# Patient Record
Sex: MALE | Race: WHITE | NOT HISPANIC OR LATINO
[De-identification: names, ages, dates, MRNs, and addresses within clinical notes are randomized per-mention and may not be internally consistent; named-entity substitution may affect disease eponyms.]

---

## 2020-10-02 DIAGNOSIS — K44.9 DIAPHRAGMATIC HERNIA W/OUT OBSTRUCTION OR GANGRENE: ICD-10-CM

## 2020-10-05 ENCOUNTER — APPOINTMENT (OUTPATIENT)
Dept: SURGERY | Facility: CLINIC | Age: 65
End: 2020-10-05
Payer: COMMERCIAL

## 2020-10-05 VITALS
RESPIRATION RATE: 16 BRPM | HEART RATE: 72 BPM | SYSTOLIC BLOOD PRESSURE: 119 MMHG | BODY MASS INDEX: 28.12 KG/M2 | HEIGHT: 66 IN | DIASTOLIC BLOOD PRESSURE: 77 MMHG | TEMPERATURE: 98.1 F | OXYGEN SATURATION: 98 % | WEIGHT: 175 LBS

## 2020-10-05 DIAGNOSIS — R07.89 OTHER CHEST PAIN: ICD-10-CM

## 2020-10-05 DIAGNOSIS — R13.10 DYSPHAGIA, UNSPECIFIED: ICD-10-CM

## 2020-10-05 DIAGNOSIS — Z87.09 PERSONAL HISTORY OF OTHER DISEASES OF THE RESPIRATORY SYSTEM: ICD-10-CM

## 2020-10-05 DIAGNOSIS — F17.290 NICOTINE DEPENDENCE, OTHER TOBACCO PRODUCT, UNCOMPLICATED: ICD-10-CM

## 2020-10-05 DIAGNOSIS — Z87.442 PERSONAL HISTORY OF URINARY CALCULI: ICD-10-CM

## 2020-10-05 PROCEDURE — 99203 OFFICE O/P NEW LOW 30 MIN: CPT

## 2020-10-05 RX ORDER — EVOLOCUMAB 140 MG/ML
140 INJECTION, SOLUTION SUBCUTANEOUS
Qty: 2 | Refills: 0 | Status: ACTIVE | COMMUNITY
Start: 2020-02-07

## 2020-10-05 RX ORDER — TESTOSTERONE
POWDER (GRAM) MISCELLANEOUS
Refills: 0 | Status: ACTIVE | COMMUNITY

## 2020-10-05 RX ORDER — TURMERIC 95 %
POWDER (GRAM) MISCELLANEOUS
Refills: 0 | Status: ACTIVE | COMMUNITY

## 2020-10-05 RX ORDER — DOXYCYCLINE HYCLATE 100 MG/1
100 CAPSULE ORAL
Qty: 14 | Refills: 0 | Status: DISCONTINUED | COMMUNITY
Start: 2020-06-15

## 2020-10-05 RX ORDER — BLOOD SUGAR DIAGNOSTIC
STRIP MISCELLANEOUS
Qty: 100 | Refills: 0 | Status: ACTIVE | COMMUNITY
Start: 2020-07-15

## 2020-10-05 RX ORDER — MONTELUKAST 10 MG/1
10 TABLET, FILM COATED ORAL
Qty: 30 | Refills: 0 | Status: ACTIVE | COMMUNITY
Start: 2020-08-24

## 2020-10-05 RX ORDER — PSYLLIUM HUSK 0.4 G
CAPSULE ORAL
Refills: 0 | Status: ACTIVE | COMMUNITY

## 2020-10-05 RX ORDER — AZELASTINE HYDROCHLORIDE 205.5 UG/1
0.15 SPRAY, METERED NASAL
Qty: 30 | Refills: 0 | Status: ACTIVE | COMMUNITY
Start: 2020-08-24

## 2020-10-05 NOTE — ASSESSMENT
[FreeTextEntry1] : 65-year-old man with\par 1.  Costochondral pain\par 2.  Intermittent painful swallowing

## 2020-10-05 NOTE — HISTORY OF PRESENT ILLNESS
[de-identified] : The patient is a 65-year-old gentleman referred here by his primary care doctor for evaluation of a "hiatal hernia."\par \par The patient has of late been experiencing pain and discomfort in his subxiphoid region that occurs with exercise, like yoga, and golf.  He denies feeling any swelling or protrusion.  While he has a prior history of heartburn and reflux, he states this has been resolved for quite some time since cutting out certain foods and not eating late at night.  He initially reports no dysphagia, however then mentions that he does experience painful swallowing and occasional chest pain after drinking liquids too quickly.  He does not report any regurgitation.  He does not report any dysphagia with solid foods.  He does not describe any unintended weight loss.  He currently has no reflux or heartburn.\par \par Past medical history is significant for prediabetes, currently on metformin\par Past surgical history significant for hip replacement, trigger finger release, and surgery for deviated septum

## 2020-10-05 NOTE — PHYSICAL EXAM
[No Rash or Lesion] : No rash or lesion [Alert] : alert [Oriented to Person] : oriented to person [Oriented to Place] : oriented to place [Oriented to Time] : oriented to time [Calm] : calm [de-identified] : Well-appearing, no acute distress [de-identified] : Sclerae anicteric [de-identified] : Normal respirations [de-identified] : Soft, nontender, nondistended.  Prominent xiphoid.

## 2020-10-05 NOTE — PLAN
[FreeTextEntry1] : -Upper GI series ordered to rule out esophageal dysmotility, hiatal hernia\par -Advised rest, NSAIDs for costochondral pain

## 2020-10-05 NOTE — CONSULT LETTER
[Dear  ___] : Dear  [unfilled], [Consult Letter:] : I had the pleasure of evaluating your patient, [unfilled]. [Please see my note below.] : Please see my note below. [Consult Closing:] : Thank you very much for allowing me to participate in the care of this patient.  If you have any questions, please do not hesitate to contact me. [Sincerely,] : Sincerely, [FreeTextEntry2] : Dr. Jevon Miller [FreeTextEntry3] : Antoni Gutiérrez MD, FACS, FASMBS\par [facility]\par [address]\[place] [address]\par tel. [phone]\par fax [phone]\par  \par

## 2020-10-09 ENCOUNTER — APPOINTMENT (OUTPATIENT)
Dept: RADIOLOGY | Facility: HOSPITAL | Age: 65
End: 2020-10-09
Payer: COMMERCIAL

## 2020-10-09 ENCOUNTER — RESULT REVIEW (OUTPATIENT)
Age: 65
End: 2020-10-09

## 2020-10-09 ENCOUNTER — OUTPATIENT (OUTPATIENT)
Dept: OUTPATIENT SERVICES | Facility: HOSPITAL | Age: 65
LOS: 1 days | End: 2020-10-09
Payer: COMMERCIAL

## 2020-10-09 DIAGNOSIS — Z00.00 ENCOUNTER FOR GENERAL ADULT MEDICAL EXAMINATION WITHOUT ABNORMAL FINDINGS: ICD-10-CM

## 2020-10-09 DIAGNOSIS — R13.10 DYSPHAGIA, UNSPECIFIED: ICD-10-CM

## 2020-10-09 PROCEDURE — 74246 X-RAY XM UPR GI TRC 2CNTRST: CPT | Mod: 26

## 2020-10-09 PROCEDURE — 74246 X-RAY XM UPR GI TRC 2CNTRST: CPT

## 2021-02-26 ENCOUNTER — TRANSCRIPTION ENCOUNTER (OUTPATIENT)
Age: 66
End: 2021-02-26

## 2021-08-25 ENCOUNTER — TRANSCRIPTION ENCOUNTER (OUTPATIENT)
Age: 66
End: 2021-08-25

## 2022-06-29 ENCOUNTER — NON-APPOINTMENT (OUTPATIENT)
Age: 67
End: 2022-06-29

## 2022-06-29 ENCOUNTER — APPOINTMENT (OUTPATIENT)
Dept: ORTHOPEDIC SURGERY | Facility: CLINIC | Age: 67
End: 2022-06-29

## 2022-06-29 PROCEDURE — 99203 OFFICE O/P NEW LOW 30 MIN: CPT

## 2022-06-29 PROCEDURE — 73560 X-RAY EXAM OF KNEE 1 OR 2: CPT | Mod: RT

## 2022-06-30 NOTE — CONSULT LETTER
[Dear  ___] : Dear  [unfilled], [Consult Letter:] : I had the pleasure of evaluating your patient, [unfilled]. [Please see my note below.] : Please see my note below. [Consult Closing:] : Thank you very much for allowing me to participate in the care of this patient.  If you have any questions, please do not hesitate to contact me. [Sincerely,] : Sincerely, [FreeTextEntry3] : Marlo Ochoa III, MD \par MARY/madeline

## 2022-06-30 NOTE — DISCUSSION/SUMMARY
[de-identified] : The patient presents with a meniscal tear, right knee.  At this time I recommend he undergo an MRI.  He will be reassessed after the MRI.

## 2022-06-30 NOTE — REVIEW OF SYSTEMS
[Joint Pain] : joint pain [Joint Stiffness] : joint stiffness [Nasal Discharge] : nasal discharge [Sore Throat] : sore throat [SOB on Exertion] : shortness of breath on exertion [Negative] : Heme/Lymph [FreeTextEntry6] : Shortness of breath

## 2022-06-30 NOTE — PHYSICAL EXAM
[de-identified] : Left knee:\par Knee: Range of Motion in Degrees	\par 	                  Claimant:	Normal:	\par Flexion Active	  135 	                135-degrees	\par Flexion Passive	  135	                135-degrees	\par Extension Active	  0-5	                0-5-degrees	\par Extension Passive	  0-5	                0-5-degrees	\par \par No weakness to flexion/extension.  No evidence of instability in the AP plane or varus or valgus stress.  Negative  Lachman.  Negative pivot shift.  Negative anterior drawer test.  Negative posterior drawer test.  Negative Hayden.  Negative Apley grind.  No medial or lateral joint line tenderness.  No tenderness over the medial and lateral facet of the patella.  No patellofemoral crepitations.  No lateral tilting patella.  No patellar apprehension.  No crepitation in the medial and lateral femoral condyle.  No proximal or distal swelling, edema or tenderness.  No gross motor or sensory deficits.  No intra-articular swelling.  2+ DP and PT pulses. No varus or valgus malalignment.  Skin is intact.  No rashes, scars or lesions.  \par  \par Right knee:\par Knee: Range of Motion in Degrees	\par 	                  Claimant:	Normal:	\par Flexion Active	  135 	                135-degrees	\par Flexion Passive	  135	                135-degrees	\par Extension Active	  0-5	                0-5-degrees	\par Extension Passive	  0-5	                0-5-degrees	\par \par Mild effusion.  No weakness to flexion/extension.  No evidence of instability in the AP plane or varus or valgus stress.  Negative  Lachman.  Negative pivot shift.  Negative anterior drawer test.  Negative posterior drawer test.  Positive medial joint line tenderness.  Negative lateral joint line tenderness.  Positive Hayden.  Positive Apley grind.  No tenderness over the medial and lateral facet of the patella.  No patellofemoral crepitations.  No lateral tilting patella.  No patella apprehension.  No crepitation in the medial and lateral femoral condyle.  No proximal or distal swelling, edema or tenderness.  No gross motor or sensory deficits.  2+ DP and PT pulses.  No varus or valgus malalignment.  Skin is intact.  No rashes, scars or lesions.   \par   [de-identified] : Gait: Slightly antalgic to antalgic gait.  Station: Normal  [de-identified] : Appearance: Well-developed, well-nourished male  in no acute distress.  [de-identified] : Radiographs taken in the office today, one to two views of the right knee, show no obvious osseous abnormality.

## 2022-06-30 NOTE — ADDENDUM
[FreeTextEntry1] : This note was written by Madison Sena on 06/30/2022 acting as scribe for Marlo Ochoa III, MD

## 2022-06-30 NOTE — HISTORY OF PRESENT ILLNESS
[de-identified] : Nikolas comes in today with complaints of pain to his right knee going on for the last several months.  He had several episodes when he was playing golf of twisting and having the onset of pain and swelling.  He had to have fluid aspirated at one point.  He notes episodes of the knee catching and popping.  The patient states the onset/injury occurred on 4/1/22.  This injury is not work related or due to an automobile accident.  The patient states the pain is intermittent and localized.  The patient describes the pain as sharp. The patient states rest and medication make the symptoms better while bending makes the symptoms worse.  [4] : a current pain level of 4/10 [de-identified] : Physical therapy [de-identified] : NSAIDS

## 2022-07-05 DIAGNOSIS — Z83.511 FAMILY HISTORY OF GLAUCOMA: ICD-10-CM

## 2022-07-05 DIAGNOSIS — Z82.49 FAMILY HISTORY OF ISCHEMIC HEART DISEASE AND OTHER DISEASES OF THE CIRCULATORY SYSTEM: ICD-10-CM

## 2022-07-05 DIAGNOSIS — Z84.1 FAMILY HISTORY OF DISORDERS OF KIDNEY AND URETER: ICD-10-CM

## 2022-07-05 DIAGNOSIS — Z86.39 PERSONAL HISTORY OF OTHER ENDOCRINE, NUTRITIONAL AND METABOLIC DISEASE: ICD-10-CM

## 2022-07-05 DIAGNOSIS — Z87.19 PERSONAL HISTORY OF OTHER DISEASES OF THE DIGESTIVE SYSTEM: ICD-10-CM

## 2022-07-05 DIAGNOSIS — Z87.442 PERSONAL HISTORY OF URINARY CALCULI: ICD-10-CM

## 2022-07-13 ENCOUNTER — APPOINTMENT (OUTPATIENT)
Dept: ORTHOPEDIC SURGERY | Facility: CLINIC | Age: 67
End: 2022-07-13

## 2022-07-13 DIAGNOSIS — S83.206A UNSPECIFIED TEAR OF UNSPECIFIED MENISCUS, CURRENT INJURY, RIGHT KNEE, INITIAL ENCOUNTER: ICD-10-CM

## 2022-07-13 PROCEDURE — 99441: CPT

## 2022-07-20 ENCOUNTER — APPOINTMENT (OUTPATIENT)
Dept: ORTHOPEDIC SURGERY | Facility: CLINIC | Age: 67
End: 2022-07-20

## 2022-07-27 PROBLEM — S83.206A ACUTE MENISCAL TEAR OF RIGHT KNEE, INITIAL ENCOUNTER: Status: ACTIVE | Noted: 2022-06-29

## 2022-08-01 ENCOUNTER — OUTPATIENT (OUTPATIENT)
Dept: OUTPATIENT SERVICES | Facility: HOSPITAL | Age: 67
LOS: 1 days | End: 2022-08-01
Payer: COMMERCIAL

## 2022-08-01 VITALS
TEMPERATURE: 98 F | HEART RATE: 65 BPM | WEIGHT: 182.98 LBS | HEIGHT: 66 IN | SYSTOLIC BLOOD PRESSURE: 109 MMHG | DIASTOLIC BLOOD PRESSURE: 69 MMHG | OXYGEN SATURATION: 100 % | RESPIRATION RATE: 16 BRPM

## 2022-08-01 DIAGNOSIS — E89.2 POSTPROCEDURAL HYPOPARATHYROIDISM: Chronic | ICD-10-CM

## 2022-08-01 DIAGNOSIS — Z96.641 PRESENCE OF RIGHT ARTIFICIAL HIP JOINT: Chronic | ICD-10-CM

## 2022-08-01 DIAGNOSIS — Z01.818 ENCOUNTER FOR OTHER PREPROCEDURAL EXAMINATION: ICD-10-CM

## 2022-08-01 DIAGNOSIS — S83.241A OTHER TEAR OF MEDIAL MENISCUS, CURRENT INJURY, RIGHT KNEE, INITIAL ENCOUNTER: ICD-10-CM

## 2022-08-01 DIAGNOSIS — Z98.890 OTHER SPECIFIED POSTPROCEDURAL STATES: Chronic | ICD-10-CM

## 2022-08-01 LAB
A1C WITH ESTIMATED AVERAGE GLUCOSE RESULT: 6.4 % — HIGH (ref 4–5.6)
ANION GAP SERPL CALC-SCNC: 5 MMOL/L — SIGNIFICANT CHANGE UP (ref 5–17)
APTT BLD: 32.4 SEC — SIGNIFICANT CHANGE UP (ref 27.5–35.5)
BASOPHILS # BLD AUTO: 0.05 K/UL — SIGNIFICANT CHANGE UP (ref 0–0.2)
BASOPHILS NFR BLD AUTO: 0.7 % — SIGNIFICANT CHANGE UP (ref 0–2)
BUN SERPL-MCNC: 14 MG/DL — SIGNIFICANT CHANGE UP (ref 7–23)
CALCIUM SERPL-MCNC: 9 MG/DL — SIGNIFICANT CHANGE UP (ref 8.5–10.1)
CHLORIDE SERPL-SCNC: 106 MMOL/L — SIGNIFICANT CHANGE UP (ref 96–108)
CO2 SERPL-SCNC: 26 MMOL/L — SIGNIFICANT CHANGE UP (ref 22–31)
CREAT SERPL-MCNC: 1.06 MG/DL — SIGNIFICANT CHANGE UP (ref 0.5–1.3)
EGFR: 77 ML/MIN/1.73M2 — SIGNIFICANT CHANGE UP
EOSINOPHIL # BLD AUTO: 0.23 K/UL — SIGNIFICANT CHANGE UP (ref 0–0.5)
EOSINOPHIL NFR BLD AUTO: 3.4 % — SIGNIFICANT CHANGE UP (ref 0–6)
ESTIMATED AVERAGE GLUCOSE: 137 MG/DL — HIGH (ref 68–114)
GLUCOSE SERPL-MCNC: 140 MG/DL — HIGH (ref 70–99)
HCT VFR BLD CALC: 54.5 % — HIGH (ref 39–50)
HGB BLD-MCNC: 18.2 G/DL — HIGH (ref 13–17)
IMM GRANULOCYTES NFR BLD AUTO: 0.4 % — SIGNIFICANT CHANGE UP (ref 0–1.5)
INR BLD: 1.02 RATIO — SIGNIFICANT CHANGE UP (ref 0.88–1.16)
LYMPHOCYTES # BLD AUTO: 2.19 K/UL — SIGNIFICANT CHANGE UP (ref 1–3.3)
LYMPHOCYTES # BLD AUTO: 31.9 % — SIGNIFICANT CHANGE UP (ref 13–44)
MCHC RBC-ENTMCNC: 29.6 PG — SIGNIFICANT CHANGE UP (ref 27–34)
MCHC RBC-ENTMCNC: 33.4 GM/DL — SIGNIFICANT CHANGE UP (ref 32–36)
MCV RBC AUTO: 88.6 FL — SIGNIFICANT CHANGE UP (ref 80–100)
MONOCYTES # BLD AUTO: 0.59 K/UL — SIGNIFICANT CHANGE UP (ref 0–0.9)
MONOCYTES NFR BLD AUTO: 8.6 % — SIGNIFICANT CHANGE UP (ref 2–14)
NEUTROPHILS # BLD AUTO: 3.77 K/UL — SIGNIFICANT CHANGE UP (ref 1.8–7.4)
NEUTROPHILS NFR BLD AUTO: 55 % — SIGNIFICANT CHANGE UP (ref 43–77)
PLATELET # BLD AUTO: 249 K/UL — SIGNIFICANT CHANGE UP (ref 150–400)
POTASSIUM SERPL-MCNC: 4.3 MMOL/L — SIGNIFICANT CHANGE UP (ref 3.5–5.3)
POTASSIUM SERPL-SCNC: 4.3 MMOL/L — SIGNIFICANT CHANGE UP (ref 3.5–5.3)
PROTHROM AB SERPL-ACNC: 11.8 SEC — SIGNIFICANT CHANGE UP (ref 10.5–13.4)
RBC # BLD: 6.15 M/UL — HIGH (ref 4.2–5.8)
RBC # FLD: 13.6 % — SIGNIFICANT CHANGE UP (ref 10.3–14.5)
SODIUM SERPL-SCNC: 137 MMOL/L — SIGNIFICANT CHANGE UP (ref 135–145)
WBC # BLD: 6.86 K/UL — SIGNIFICANT CHANGE UP (ref 3.8–10.5)
WBC # FLD AUTO: 6.86 K/UL — SIGNIFICANT CHANGE UP (ref 3.8–10.5)

## 2022-08-01 PROCEDURE — 93005 ELECTROCARDIOGRAM TRACING: CPT

## 2022-08-01 PROCEDURE — 36415 COLL VENOUS BLD VENIPUNCTURE: CPT

## 2022-08-01 PROCEDURE — 99214 OFFICE O/P EST MOD 30 MIN: CPT | Mod: 25

## 2022-08-01 PROCEDURE — 85610 PROTHROMBIN TIME: CPT

## 2022-08-01 PROCEDURE — 83036 HEMOGLOBIN GLYCOSYLATED A1C: CPT

## 2022-08-01 PROCEDURE — 80048 BASIC METABOLIC PNL TOTAL CA: CPT

## 2022-08-01 PROCEDURE — 85730 THROMBOPLASTIN TIME PARTIAL: CPT

## 2022-08-01 PROCEDURE — 93010 ELECTROCARDIOGRAM REPORT: CPT

## 2022-08-01 PROCEDURE — 85025 COMPLETE CBC W/AUTO DIFF WBC: CPT

## 2022-08-01 NOTE — H&P PST ADULT - ACTIVITY
Patient takes Jaunmet at home  Continue with metformin 500mg BID  HSS and accuchecks  A1c = 7
Plays golf 2x a week, Yoga intermittently

## 2022-08-01 NOTE — H&P PST ADULT - ASSESSMENT
67 y.o male scheduled for  67 y.o male scheduled for  Right Knee Arthroscopy Meniscectomy Debridement   Plan  1. Stop all NSAIDS, herbal supplements and vitamins for 7 days.  2. NPO at midnight.  3. Take the following medications--none--with small sips of water on the morning of your procedure/surgery.  4. Use EZ sponges as directed  5. Labs, EKG as per surgeon  6. PMD ISABELA Miller  visit for optimization prior to surgery as per surgeon  7. COVID swab to be done 8/9/2022

## 2022-08-01 NOTE — H&P PST ADULT - NSICDXPASTSURGICALHX_GEN_ALL_CORE_FT
PAST SURGICAL HISTORY:  H/O nasal septoplasty 1970's    H/O parathyroidectomy one--10 yrs ago    History of esophagogastroduodenoscopy (EGD) 2020--Esophageal dilatation, last EGD --6/2022    History of right hip replacement 6/2017    Left trigger finger 2009 trigger finger release  x2    Surgery for Varicose vein pt does not remember which leg , ? right 2010

## 2022-08-01 NOTE — H&P PST ADULT - HISTORY OF PRESENT ILLNESS
67 y.o  67 y.o WD, WN male presents to PST with hx of right knee pain. Patient reports right knee pain that started the end of March. He had the pain last year but it resolved . The right knee pain recurred this year , worse after sitting. He denies swelling or instability in the knee. Patient followed with ortho and states diagnostics revealed a torn meniscus. He is active, plays golf weekly and opting for surgical intervention. Scheduled for a Right Knee Arthroscopy Meniscectomy Debridement

## 2022-08-01 NOTE — H&P PST ADULT - NSICDXPASTMEDICALHX_GEN_ALL_CORE_FT
PAST MEDICAL HISTORY:  Arthritis     DM (diabetes mellitus), type 2     GERD (gastroesophageal reflux disease)     H/O sinusitis     History of dysphagia     Hyperlipidemia     Kidney stone     Low testosterone     ALEXANDER (obstructive sleep apnea) Pt reports he had "borderline sleep study " in 2007.  No CPAP Rx at that time.  Per pt, symptoms improved after 10 lb wt loss 2010    Parathyroid dysfunction elevated calcium    Torn meniscus right knee

## 2022-08-02 DIAGNOSIS — Z01.818 ENCOUNTER FOR OTHER PREPROCEDURAL EXAMINATION: ICD-10-CM

## 2022-08-02 DIAGNOSIS — S83.241A OTHER TEAR OF MEDIAL MENISCUS, CURRENT INJURY, RIGHT KNEE, INITIAL ENCOUNTER: ICD-10-CM

## 2022-08-08 RX ORDER — OXYCODONE AND ACETAMINOPHEN 5; 325 MG/1; MG/1
5-325 TABLET ORAL
Qty: 30 | Refills: 0 | Status: ACTIVE | COMMUNITY
Start: 2022-08-08 | End: 1900-01-01

## 2022-08-12 ENCOUNTER — APPOINTMENT (OUTPATIENT)
Dept: ORTHOPEDIC SURGERY | Facility: HOSPITAL | Age: 67
End: 2022-08-12

## 2022-08-12 ENCOUNTER — OUTPATIENT (OUTPATIENT)
Dept: INPATIENT UNIT | Facility: HOSPITAL | Age: 67
LOS: 1 days | Discharge: HOME | End: 2022-08-12
Payer: COMMERCIAL

## 2022-08-12 ENCOUNTER — TRANSCRIPTION ENCOUNTER (OUTPATIENT)
Age: 67
End: 2022-08-12

## 2022-08-12 ENCOUNTER — RESULT REVIEW (OUTPATIENT)
Age: 67
End: 2022-08-12

## 2022-08-12 VITALS
HEART RATE: 56 BPM | OXYGEN SATURATION: 99 % | SYSTOLIC BLOOD PRESSURE: 134 MMHG | TEMPERATURE: 97 F | RESPIRATION RATE: 16 BRPM | DIASTOLIC BLOOD PRESSURE: 58 MMHG

## 2022-08-12 VITALS
TEMPERATURE: 98 F | OXYGEN SATURATION: 96 % | WEIGHT: 177.91 LBS | HEART RATE: 65 BPM | SYSTOLIC BLOOD PRESSURE: 125 MMHG | DIASTOLIC BLOOD PRESSURE: 78 MMHG | HEIGHT: 67 IN | RESPIRATION RATE: 16 BRPM

## 2022-08-12 DIAGNOSIS — Z96.641 PRESENCE OF RIGHT ARTIFICIAL HIP JOINT: Chronic | ICD-10-CM

## 2022-08-12 DIAGNOSIS — E89.2 POSTPROCEDURAL HYPOPARATHYROIDISM: Chronic | ICD-10-CM

## 2022-08-12 DIAGNOSIS — M17.11 UNILATERAL PRIMARY OSTEOARTHRITIS, RIGHT KNEE: ICD-10-CM

## 2022-08-12 DIAGNOSIS — Z98.890 OTHER SPECIFIED POSTPROCEDURAL STATES: Chronic | ICD-10-CM

## 2022-08-12 DIAGNOSIS — S83.241A OTHER TEAR OF MEDIAL MENISCUS, CURRENT INJURY, RIGHT KNEE, INITIAL ENCOUNTER: ICD-10-CM

## 2022-08-12 PROCEDURE — 88304 TISSUE EXAM BY PATHOLOGIST: CPT | Mod: 26

## 2022-08-12 PROCEDURE — 82962 GLUCOSE BLOOD TEST: CPT

## 2022-08-12 PROCEDURE — 29881 ARTHRS KNE SRG MNISECTMY M/L: CPT | Mod: GC,RT

## 2022-08-12 PROCEDURE — 88304 TISSUE EXAM BY PATHOLOGIST: CPT

## 2022-08-12 RX ORDER — KETOROLAC TROMETHAMINE 30 MG/ML
30 SYRINGE (ML) INJECTION ONCE
Refills: 0 | Status: DISCONTINUED | OUTPATIENT
Start: 2022-08-12 | End: 2022-08-12

## 2022-08-12 RX ORDER — ACETAMINOPHEN 500 MG
1000 TABLET ORAL ONCE
Refills: 0 | Status: COMPLETED | OUTPATIENT
Start: 2022-08-12 | End: 2022-08-12

## 2022-08-12 RX ORDER — SODIUM CHLORIDE 9 MG/ML
1000 INJECTION, SOLUTION INTRAVENOUS
Refills: 0 | Status: DISCONTINUED | OUTPATIENT
Start: 2022-08-12 | End: 2022-08-12

## 2022-08-12 RX ORDER — PANTOPRAZOLE SODIUM 20 MG/1
1 TABLET, DELAYED RELEASE ORAL
Qty: 0 | Refills: 0 | DISCHARGE

## 2022-08-12 RX ORDER — MONTELUKAST 4 MG/1
1 TABLET, CHEWABLE ORAL
Qty: 0 | Refills: 0 | DISCHARGE

## 2022-08-12 RX ORDER — FEXOFENADINE HCL 30 MG
0 TABLET ORAL
Qty: 0 | Refills: 0 | DISCHARGE

## 2022-08-12 RX ORDER — FENTANYL CITRATE 50 UG/ML
50 INJECTION INTRAVENOUS
Refills: 0 | Status: DISCONTINUED | OUTPATIENT
Start: 2022-08-12 | End: 2022-08-12

## 2022-08-12 RX ORDER — SEMAGLUTIDE 0.68 MG/ML
0 INJECTION, SOLUTION SUBCUTANEOUS
Qty: 0 | Refills: 0 | DISCHARGE

## 2022-08-12 RX ORDER — OXYCODONE HYDROCHLORIDE 5 MG/1
5 TABLET ORAL ONCE
Refills: 0 | Status: DISCONTINUED | OUTPATIENT
Start: 2022-08-12 | End: 2022-08-12

## 2022-08-12 RX ORDER — AZELASTINE 137 UG/1
2 SPRAY, METERED NASAL
Qty: 0 | Refills: 0 | DISCHARGE

## 2022-08-12 RX ORDER — FLUTICASONE PROPIONATE 50 MCG
2 SPRAY, SUSPENSION NASAL
Qty: 0 | Refills: 0 | DISCHARGE

## 2022-08-12 RX ADMIN — OXYCODONE HYDROCHLORIDE 5 MILLIGRAM(S): 5 TABLET ORAL at 09:00

## 2022-08-12 RX ADMIN — Medication 30 MILLIGRAM(S): at 10:17

## 2022-08-12 RX ADMIN — Medication 1000 MILLIGRAM(S): at 09:30

## 2022-08-12 RX ADMIN — OXYCODONE HYDROCHLORIDE 5 MILLIGRAM(S): 5 TABLET ORAL at 09:30

## 2022-08-12 RX ADMIN — Medication 400 MILLIGRAM(S): at 09:01

## 2022-08-12 NOTE — ASU DISCHARGE PLAN (ADULT/PEDIATRIC) - CARE PROVIDER_API CALL
Marlo Ochoa)  Orthopaedic Surgery  [address] [place] [address]  Phone: [phone]  Fax: [phone]  Follow Up Time:

## 2022-08-12 NOTE — ASU DISCHARGE PLAN (ADULT/PEDIATRIC) - BRAND OF COVID-19 VACCINATION
Moderna dose 1 and 2 Normal vision: sees adequately in most situations; can see medication labels, newsprint

## 2022-08-12 NOTE — ASU PATIENT PROFILE, ADULT - FALL HARM RISK - UNIVERSAL INTERVENTIONS
Bed in lowest position, wheels locked, appropriate side rails in place/Call bell, personal items and telephone in reach/Instruct patient to call for assistance before getting out of bed or chair/Non-slip footwear when patient is out of bed/Ross to call system/Physically safe environment - no spills, clutter or unnecessary equipment/Purposeful Proactive Rounding/Room/bathroom lighting operational, light cord in reach

## 2022-08-12 NOTE — ASU DISCHARGE PLAN (ADULT/PEDIATRIC) - NS MD DC FALL RISK RISK
For information on Fall & Injury Prevention, visit: [email or web address] OR  [email or web address] OR  https://www.cdc.gov/steadi/patient.html

## 2022-08-16 PROBLEM — S83.209A UNSPECIFIED TEAR OF UNSPECIFIED MENISCUS, CURRENT INJURY, UNSPECIFIED KNEE, INITIAL ENCOUNTER: Chronic | Status: ACTIVE | Noted: 2022-08-01

## 2022-08-16 PROBLEM — E21.4 OTHER SPECIFIED DISORDERS OF PARATHYROID GLAND: Chronic | Status: ACTIVE | Noted: 2022-08-01

## 2022-08-16 PROBLEM — K21.9 GASTRO-ESOPHAGEAL REFLUX DISEASE WITHOUT ESOPHAGITIS: Chronic | Status: ACTIVE | Noted: 2022-08-01

## 2022-08-16 PROBLEM — M19.90 UNSPECIFIED OSTEOARTHRITIS, UNSPECIFIED SITE: Chronic | Status: ACTIVE | Noted: 2022-08-01

## 2022-08-16 PROBLEM — Z87.09 PERSONAL HISTORY OF OTHER DISEASES OF THE RESPIRATORY SYSTEM: Chronic | Status: ACTIVE | Noted: 2022-08-01

## 2022-08-16 PROBLEM — E11.9 TYPE 2 DIABETES MELLITUS WITHOUT COMPLICATIONS: Chronic | Status: ACTIVE | Noted: 2022-08-01

## 2022-08-16 PROBLEM — Z87.19 PERSONAL HISTORY OF OTHER DISEASES OF THE DIGESTIVE SYSTEM: Chronic | Status: ACTIVE | Noted: 2022-08-01

## 2022-08-17 DIAGNOSIS — M23.231 DERANGEMENT OF OTHER MEDIAL MENISCUS DUE TO OLD TEAR OR INJURY, RIGHT KNEE: ICD-10-CM

## 2022-08-17 DIAGNOSIS — M65.9 SYNOVITIS AND TENOSYNOVITIS, UNSPECIFIED: ICD-10-CM

## 2022-08-17 DIAGNOSIS — G47.33 OBSTRUCTIVE SLEEP APNEA (ADULT) (PEDIATRIC): ICD-10-CM

## 2022-08-17 DIAGNOSIS — E11.9 TYPE 2 DIABETES MELLITUS WITHOUT COMPLICATIONS: ICD-10-CM

## 2022-08-17 DIAGNOSIS — Z96.641 PRESENCE OF RIGHT ARTIFICIAL HIP JOINT: ICD-10-CM

## 2022-08-17 DIAGNOSIS — M17.11 UNILATERAL PRIMARY OSTEOARTHRITIS, RIGHT KNEE: ICD-10-CM

## 2022-08-17 DIAGNOSIS — Z87.891 PERSONAL HISTORY OF NICOTINE DEPENDENCE: ICD-10-CM

## 2022-08-17 DIAGNOSIS — E78.5 HYPERLIPIDEMIA, UNSPECIFIED: ICD-10-CM

## 2022-08-17 DIAGNOSIS — K21.9 GASTRO-ESOPHAGEAL REFLUX DISEASE WITHOUT ESOPHAGITIS: ICD-10-CM

## 2022-08-25 ENCOUNTER — APPOINTMENT (OUTPATIENT)
Dept: ORTHOPEDIC SURGERY | Facility: CLINIC | Age: 67
End: 2022-08-25

## 2022-08-25 DIAGNOSIS — Z98.890 OTHER SPECIFIED POSTPROCEDURAL STATES: ICD-10-CM

## 2022-08-25 PROCEDURE — 99024 POSTOP FOLLOW-UP VISIT: CPT

## 2022-09-12 ENCOUNTER — APPOINTMENT (OUTPATIENT)
Dept: ORTHOPEDIC SURGERY | Facility: CLINIC | Age: 67
End: 2022-09-12

## 2022-09-12 VITALS
SYSTOLIC BLOOD PRESSURE: 131 MMHG | DIASTOLIC BLOOD PRESSURE: 82 MMHG | WEIGHT: 179 LBS | BODY MASS INDEX: 28.09 KG/M2 | HEART RATE: 68 BPM | HEIGHT: 67 IN

## 2022-09-12 PROCEDURE — 99214 OFFICE O/P EST MOD 30 MIN: CPT | Mod: 25

## 2022-09-12 PROCEDURE — 20550 NJX 1 TENDON SHEATH/LIGAMENT: CPT | Mod: F8

## 2022-09-12 NOTE — HISTORY OF PRESENT ILLNESS
[FreeTextEntry1] : Patient is 66 yo RHD male previously seen by myself with trigger finger releases bilaterally and middle and little fingers more than 10 years ago.\par Patient treated by Kimi Beaver MD, with release of left index trigger finger 12/1/2014.\par Patient under care of Marlo Ochoa MD for knee pain.\par Patient has not return for additional hand complaints since 2014.\par \par TODAY:\par Patient resents for hand evaluation.\par Patient has triggering right ring finger approximately 6 weeks duration.\par Triggering is somewhat random.\par Pt plays golf up to 2-3 x per week.\par Patient denies numbness or tingling.\par Patient denies other trigger fingers.\par Patient has no other notable hand complaints at this time.\par \par Pt had meniscus surgery by Wm Gabriela MD and is doing ok.\par \par 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

## 2022-09-12 NOTE — DISCUSSION/SUMMARY
[FreeTextEntry1] : Patient has trigger finger right ring finger that has been randomly triggering and can be quite symptomatic at times.  Patient has had multiple other trigger fingers some treated with steroid injection successfully and others requiring surgery.  Patient has mild Dupuytren's nodules and cords without contracture that do not require treatment.\par I reviewed treatment options risks and complications with patient.  Patient requested and was treated with 2 phase cortisone injection into right ring finger flexor sheath for right ring trigger finger.\par The statistical chances of resolution versus recurrence, and the statistics regarding the possibility of additional injections  were discussed with the patient.\par The following post-injection instructions were given to the patient: The patient should be cautious in activities for two weeks and then increase to full activities.  Thereafter, the patient should return if symptoms continue, or if they sherrie and recur subsequently. If symptoms do not recur, the patient need not return and can be seen on an as needed basis. The patient has expressed understanding and acceptance of analysis, treatment, and recommendations.  All questions answered.

## 2022-09-12 NOTE — PHYSICAL EXAM
[de-identified] : Right hand\par right ring finger is actively triggering.\par There is no other A1 pulley tenderness or triggering in any other finger, right hand.\par Dupuytren's nodules at DPC.  Cords without contracture.\par No other notable Dupuytren's involvement right hand.\par Basal joint manipulation faint crepitus no pain.\par No pertinent MP, PIP, or DIP joint contributory findings, except some Heberden's nodes; none are clinically painful.\par \par Left hand\par No A1 pulley tenderness and no triggering in any finger.\par Dupuytren's nodules at distal palmar crease.  Cords palpable.  No flexion contractures.\par Basal joint manipulation minimal crepitus, no pain \par no pertinent MP, PIP, or DIP joint contributory findings, except some Heberden's nodes; none are clinically painful.\par \par Neurologic: Median, ulnar, and radial motor and sensory are intact. \par Skin: No cyanosis, clubbing, or rashes.\par Vascular: Radial pulses intact.\par Lymphatic: No streaking or epitrochlear adenopathy.\par The patient is awake, alert, and oriented. Affect appropriate. Cooperative.

## 2022-09-14 NOTE — HISTORY OF PRESENT ILLNESS
[de-identified] : Postop Right Knee [de-identified] : The patient comes in today for his postop visit status post right knee arthroscopic intervention on 08/12/22.  He states he is feeling much better. [de-identified] : Right Knee:  The wound is healing with no signs of infection.  Sutures are removed. [de-identified] : Status post right knee arthroscopy [de-identified] : At this time, since the patient is doing well status post right knee arthroscopy, he will start on therapy and be reassessed in 4-6 weeks.\par

## 2022-09-14 NOTE — ADDENDUM
[FreeTextEntry1] : This note was written by Julius Bronson on 09/14/2022, acting as a scribe for Marlo Ochoa III, MD

## 2023-01-16 NOTE — ASU PATIENT PROFILE, ADULT - PROVIDER NOTIFICATION
Call placed to patient  Advised that sleep study shows mild MILDRED (FANNIE-8 6) which is down from pre-Inspire FANNIE-23 7  Patient advised to begin titrating Inspire  Patient scheduled to follow-up with Dr Komal Yee [id number]/[id number] 
Declines

## 2023-02-06 ENCOUNTER — APPOINTMENT (OUTPATIENT)
Dept: ORTHOPEDIC SURGERY | Facility: CLINIC | Age: 68
End: 2023-02-06
Payer: COMMERCIAL

## 2023-02-06 VITALS
HEART RATE: 83 BPM | TEMPERATURE: 97.4 F | OXYGEN SATURATION: 98 % | WEIGHT: 170 LBS | HEIGHT: 67 IN | SYSTOLIC BLOOD PRESSURE: 118 MMHG | DIASTOLIC BLOOD PRESSURE: 75 MMHG | BODY MASS INDEX: 26.68 KG/M2

## 2023-02-06 PROCEDURE — 99214 OFFICE O/P EST MOD 30 MIN: CPT | Mod: 25

## 2023-02-06 PROCEDURE — 20550 NJX 1 TENDON SHEATH/LIGAMENT: CPT | Mod: F3

## 2023-02-06 NOTE — DISCUSSION/SUMMARY
[FreeTextEntry1] : Patient presents having developed a new trigger finger, left ring finger.  Previous surgical release of the left index left long and left little trigger fingers and these fingers are doing well.\par Patient had cortisone injection for right ring trigger finger in September and was initially under the impression that left ring finger had recurred but, in fact, the left ring finger has not had triggering in the past.  The right ring trigger finger which was treated in September is asymptomatic today and has not yet recurred.\par I reviewed treatment options risks and complications with the patient.  Patient requested and was treated with 2 phase cortisone injection for left ring trigger finger.\par Patient has Dupuytren's nodules and cords without contracture in both hands.  No treatment is indicated.\par Patient has no other active hand problem requiring treatment.\par Patient states that he was recently diagnosed with prostate cancer, Bipin 7 score, and patient is investigating treatment options including surgery and/or radiation for example.\par The statistical chances of resolution versus recurrence, and the statistics regarding the possibility of additional injections  were discussed with the patient.\par The following post-injection instructions were given to the patient: The patient should be cautious in activities for two weeks and then increase to full activities.  Thereafter, the patient should return if symptoms continue, or if they sherrie and recur subsequently. If symptoms do not recur, the patient need not return and can be seen on an as needed basis. The patient has expressed understanding and acceptance of analysis, treatment, and recommendations.  All questions answered.\par

## 2023-02-06 NOTE — HISTORY OF PRESENT ILLNESS
[FreeTextEntry1] : Patient is 66 yo RHD male most recently seen 9/12/2022.\par Patient had other hand problems.\par \par 1.  R4 trigger injection 9/12/2022\par 2.  Release L2 trigger, 12/1/2014, Kimi Beaver MD\par 3.  Remote history of trigger release bilateral long and bilateral little fingers, by LBL, greater than 10 years ago.\par 4.  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, [employer], [employer]par \par TODAY:\par Patient returns for hand evaluation.\par Right ring finger is asymptomatic today.\par Patient presents with a new trigger finger, left ring finger.\par Triggering began approximately 2 months ago, around Tanner time\par Patient has no other trigger fingers.\par Patient not aware of numbness or tingling.\par Patient has no other hand complaints.\par \par Patient states that he has recently been diagnosed with prostate cancer, Bipin score 7.\par Patient states that he is investigating what treatment to have for his prostate cancer, surgery, radiation, combination of treatments.

## 2023-02-06 NOTE — PHYSICAL EXAM
[de-identified] : Right hand\par No A1 pulley tenderness and no triggering in any finger.\par Dupuytren's nodules at DPC.  Cords without contracture.\par No other notable Dupuytren's involvement right hand.\par Basal joint manipulation faint crepitus no pain.\par No pertinent MP, PIP, or DIP joint contributory findings, except some Heberden's nodes; none are clinically painful.\par \par Left hand\par Ring finger A1 pulley is tender\par Active triggering is noted with full composite flexion and active extension of ring finger\par There is no other A1 pulley tenderness or triggering in any other finger, left hand.  \par Dupuytren's nodules at distal palmar crease.  Cords palpable.  No flexion contractures.\par Basal joint manipulation minimal crepitus, no pain \par no pertinent MP, PIP, or DIP joint contributory findings, except some Heberden's nodes; none are clinically painful.\par \par Neurologic: Median, ulnar, and radial motor and sensory are intact. \par Skin: No cyanosis, clubbing, or rashes.\par Vascular: Radial pulses intact.\par Lymphatic: No streaking or epitrochlear adenopathy.\par The patient is awake, alert, and oriented. Affect appropriate. Cooperative.

## 2023-04-25 ENCOUNTER — APPOINTMENT (OUTPATIENT)
Dept: ORTHOPEDIC SURGERY | Facility: CLINIC | Age: 68
End: 2023-04-25
Payer: COMMERCIAL

## 2023-04-25 VITALS — HEIGHT: 67 IN | BODY MASS INDEX: 26.68 KG/M2 | WEIGHT: 170 LBS

## 2023-04-25 PROCEDURE — 99214 OFFICE O/P EST MOD 30 MIN: CPT | Mod: 25

## 2023-04-25 PROCEDURE — 20550 NJX 1 TENDON SHEATH/LIGAMENT: CPT | Mod: F3

## 2023-04-25 NOTE — PHYSICAL EXAM
[de-identified] : Right hand\par No A1 pulley tenderness and no triggering in any finger.\par Dupuytren's nodules at DPC.  Cords without contracture.\par No other notable Dupuytren's involvement right hand.\par Basal joint manipulation faint crepitus no pain.\par No pertinent MP, PIP, or DIP joint contributory findings, except some Heberden's nodes; none are clinically painful.\par \par Left hand\par Ring finger A1 pulley is tender\par Active triggering is noted with full composite flexion and active extension of ring finger\par Pt reports more pain with left ring triggering than other fingers\par There is no other A1 pulley tenderness or triggering in any other finger, left hand.  \par Dupuytren's nodules at distal palmar crease.  Cords palpable.  No flexion contractures.\par Basal joint manipulation minimal crepitus, no pain \par no pertinent MP, PIP, or DIP joint contributory findings, except some Heberden's nodes; none are clinically painful.\par \par Neurologic: Median, ulnar, and radial motor and sensory are intact. \par Skin: No cyanosis, clubbing, or rashes.\par Vascular: Radial pulses intact.\par Lymphatic: No streaking or epitrochlear adenopathy.\par The patient is awake, alert, and oriented. Affect appropriate. Cooperative.

## 2023-04-25 NOTE — HISTORY OF PRESENT ILLNESS
[FreeTextEntry1] : Patient is 66 yo RHD male most recently seen 2/6/2023 because of left ring trigger finger.\par Patient had other hand problems.\par \par 1. R4 trigger injection 9/12/2022\par 2. Release L2 trigger, 12/1/2014, Kimi Beaver MD\par 3. Remote history of trigger release bilateral long and bilateral little fingers, by LBL, greater than 10 years ago.\par 4.  L4 trigger injection 2/6/2023\par 5.  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

## 2023-06-19 NOTE — DISCUSSION/SUMMARY
Return with any worsening symptoms questions comments or concerns    Follow-up with your family doctor for ongoing care on re-evaluation     We will call you if your tests are positives [FreeTextEntry1] : Patient has recurrence of left ring finger after 2 months.  Previous surgical release of the left index left long and left little trigger fingers and these fingers are doing well.\par Patient had cortisone injection for right ring trigger finger in September and was initially under the impression that left ring finger had recurred but, in fact, the left ring finger has not had triggering in the past.  The right ring trigger finger which was treated in September is asymptomatic today and has not yet recurred.\par I reviewed treatment options risks and complications with the patient.  Patient requested and was treated with 2 phase cortisone injection for left ring trigger finger.\par Patient has Dupuytren's nodules and cords without contracture in both hands.  No treatment is indicated.\par Patient has no other active hand problem requiring treatment.\par Patient states that he was recently diagnosed with prostate cancer, Bipin 7 score, and patient is investigating treatment options including surgery and/or radiation for example.\par The statistical chances of resolution versus recurrence, and the statistics regarding the possibility of additional injections  were discussed with the patient.\par The following post-injection instructions were given to the patient: The patient should be cautious in activities for two weeks and then increase to full activities.  Thereafter, the patient should return if symptoms continue, or if they sherrie and recur subsequently. If symptoms do not recur, the patient need not return and can be seen on an as needed basis. The patient has expressed understanding and acceptance of analysis, treatment, and recommendations.  All questions answered.\par \par Patient reports that the status of his prostate cancer.  He has been in communication with his urologist, and radiation oncologist.  Patient states that he will most likely be having CyberKnife or radioactive seed implantation.  Patient wishes to delay this until late summer or autumn 2023.\par \par I have also discussed possible need for trigger release surgery.  Patient enjoys playing golf.  Assuming patient has not unknown complicated postop course he may be able to return to golf 3 weeks postoperatively.  This, of course, cannot be guaranteed or assured but is tentative.

## 2023-08-16 ENCOUNTER — APPOINTMENT (OUTPATIENT)
Dept: ORTHOPEDIC SURGERY | Facility: CLINIC | Age: 68
End: 2023-08-16
Payer: COMMERCIAL

## 2023-08-16 VITALS
DIASTOLIC BLOOD PRESSURE: 77 MMHG | SYSTOLIC BLOOD PRESSURE: 162 MMHG | HEIGHT: 67 IN | WEIGHT: 170 LBS | HEART RATE: 71 BPM | BODY MASS INDEX: 26.68 KG/M2

## 2023-08-16 DIAGNOSIS — M65.341 TRIGGER FINGER, RIGHT RING FINGER: ICD-10-CM

## 2023-08-16 DIAGNOSIS — M65.342 TRIGGER FINGER, LEFT RING FINGER: ICD-10-CM

## 2023-08-16 DIAGNOSIS — M72.0 PALMAR FASCIAL FIBROMATOSIS [DUPUYTREN]: ICD-10-CM

## 2023-08-16 PROCEDURE — 99214 OFFICE O/P EST MOD 30 MIN: CPT | Mod: 25

## 2023-08-16 PROCEDURE — 20550 NJX 1 TENDON SHEATH/LIGAMENT: CPT | Mod: F8

## 2023-08-20 PROBLEM — M72.0 DUPUYTREN'S CONTRACTURE OF LEFT HAND: Status: ACTIVE | Noted: 2022-09-12

## 2023-08-20 PROBLEM — M72.0 DUPUYTREN'S CONTRACTURE OF RIGHT HAND: Status: ACTIVE | Noted: 2022-09-12

## 2023-08-20 NOTE — DISCUSSION/SUMMARY
[FreeTextEntry1] : Patient has recurrence of right ring finger, after previous injection in 2022. The left ring finger has had triggering in the past but has not recurred. I reviewed treatment options risks and complications with the patient.  Patient requested and was treated with 2 phase cortisone injection for right ring trigger finger. Patient has Dupuytren's nodules and cords without contracture in both hands.  No treatment is indicated. Patient has no other active hand problem requiring treatment. Patient states that he is under treatment for prostate cancer, Bipin 7 score. Regarding trigger finger of right ring finger as well as left ring finger, the statistical chances of resolution versus recurrence, and the statistics regarding the possibility of additional injections were discussed with the patient. Prognosis uncertain. Because of recurrences statistical chance for recurrence of either right or left trigger finger is higher than if the patient had not had any cortisone injections.  The following post-injection instructions were given to the patient: The patient should be cautious in activities for two weeks and then increase to full activities.  Thereafter, the patient should return if symptoms continue, or if they sherrie and recur subsequently. If symptoms do not recur, the patient need not return and can be seen on an as needed basis. The patient has expressed understanding and acceptance of analysis, treatment, and recommendations.  All questions answered.  I have also discussed possible need for trigger release surgery.  Patient enjoys playing golf.  Assuming patient has uncomplicated postop course following trigger release, he may be able to return to golf 3 weeks postoperatively.  This, of course, cannot be guaranteed or assured but is tentative.

## 2023-08-20 NOTE — PHYSICAL EXAM
[de-identified] : Right hand Ring finger A1 pulley is tender. There is active triggering when patient makes a fist. Ring finger triggers into extension with associated pain that recreates symptoms. Dupuytren's nodules at DPC.  Cords without contracture. No other notable Dupuytren's involvement right hand. Basal joint manipulation faint crepitus no pain. No pertinent MP, PIP, or DIP joint contributory findings, except some Heberden's nodes; none are clinically painful.  Left hand No A1 pulley tenderness and no triggering in any finger. Dupuytren's nodules at distal palmar crease.  Cords palpable.  No flexion contractures. Basal joint manipulation minimal crepitus, no pain  no pertinent MP, PIP, or DIP joint contributory findings, except some Heberden's nodes; none are clinically painful.  Neurologic: Median, ulnar, and radial motor and sensory are intact.  Skin: No cyanosis, clubbing, or rashes. Vascular: Radial pulses intact. Lymphatic: No streaking or epitrochlear adenopathy. The patient is awake, alert, and oriented. Affect appropriate. Cooperative.

## 2023-08-20 NOTE — PROCEDURE
[] : The injection was done in 2 phases with the first phase being subcutaneous injection of lidocaine 1.5 cc subcutaneously as anesthetic. When adequate level of anesthesia was achieved, the Celestone injection was undertaken. [4th] : 4th finger [FreeTextEntry1] : KENALOG 20 mg injected

## 2023-08-20 NOTE — HISTORY OF PRESENT ILLNESS
[FreeTextEntry1] : Patient is 67 yo RHD male seen most recently 4/25/2023.  Past hand history: 1. R4 trigger injection 9/12/2022 2. Release L2 trigger, 12/1/2014, Kimi Beaver MD 3. Remote history of trigger release bilateral long and bilateral little fingers, by LBL, greater than 10 years ago. 4. L4 trigger injection 2/6/2023, 4/25/2023 (Kenalog 20 mg) 5. History of knee meniscus surgery Marlo Ochoa MD.  History of recent diagnosis prostate cancer, Bipin 7. 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  TODAY: Patient presents as RETURN PATIENT for hand evaluation. Patient has recurrence of right ring triggering approximately 6 to 8 weeks ago. Patient reports sometimes when the ring finger triggers it is quite painful and finger can lock. Patient has no other active trigger fingers at this time. Patient not aware of numbness or tingling. No other notable hand complaints.

## 2024-10-08 ENCOUNTER — APPOINTMENT (OUTPATIENT)
Dept: ORTHOPEDIC SURGERY | Facility: CLINIC | Age: 69
End: 2024-10-08
Payer: COMMERCIAL

## 2024-10-08 VITALS — BODY MASS INDEX: 25.9 KG/M2 | HEIGHT: 67 IN | WEIGHT: 165 LBS

## 2024-10-08 DIAGNOSIS — M72.0 PALMAR FASCIAL FIBROMATOSIS [DUPUYTREN]: ICD-10-CM

## 2024-10-08 DIAGNOSIS — M65.341 TRIGGER FINGER, RIGHT RING FINGER: ICD-10-CM

## 2024-10-08 DIAGNOSIS — M65.342 TRIGGER FINGER, LEFT RING FINGER: ICD-10-CM

## 2024-10-08 PROCEDURE — 99214 OFFICE O/P EST MOD 30 MIN: CPT | Mod: 25

## 2024-10-08 PROCEDURE — 99213 OFFICE O/P EST LOW 20 MIN: CPT | Mod: 25

## 2024-10-08 PROCEDURE — 20550 NJX 1 TENDON SHEATH/LIGAMENT: CPT | Mod: 59,LT

## 2025-07-15 ENCOUNTER — APPOINTMENT (OUTPATIENT)
Dept: ORTHOPEDIC SURGERY | Facility: CLINIC | Age: 70
End: 2025-07-15
Payer: COMMERCIAL

## 2025-07-15 ENCOUNTER — NON-APPOINTMENT (OUTPATIENT)
Age: 70
End: 2025-07-15

## 2025-07-15 VITALS — BODY MASS INDEX: 26.52 KG/M2 | WEIGHT: 165 LBS | HEIGHT: 66 IN

## 2025-07-15 PROCEDURE — 99214 OFFICE O/P EST MOD 30 MIN: CPT | Mod: 25

## 2025-07-15 PROCEDURE — 20550 NJX 1 TENDON SHEATH/LIGAMENT: CPT | Mod: 50
